# Patient Record
Sex: MALE | Race: WHITE
[De-identification: names, ages, dates, MRNs, and addresses within clinical notes are randomized per-mention and may not be internally consistent; named-entity substitution may affect disease eponyms.]

---

## 2018-09-20 ENCOUNTER — HOSPITAL ENCOUNTER (OUTPATIENT)
Dept: HOSPITAL 92 - SDC | Age: 73
Discharge: HOME | End: 2018-09-20
Attending: OPHTHALMOLOGY
Payer: MEDICARE

## 2018-09-20 DIAGNOSIS — H43.311: Primary | ICD-10-CM

## 2018-09-20 DIAGNOSIS — Z79.899: ICD-10-CM

## 2018-09-20 PROCEDURE — 08T43ZZ RESECTION OF RIGHT VITREOUS, PERCUTANEOUS APPROACH: ICD-10-PCS | Performed by: OPHTHALMOLOGY

## 2018-09-20 PROCEDURE — 08NE3ZZ RELEASE RIGHT RETINA, PERCUTANEOUS APPROACH: ICD-10-PCS | Performed by: OPHTHALMOLOGY

## 2018-09-20 NOTE — OP
DATE OF PROCEDURE:  09/20/2018

 

PREOPERATIVE DIAGNOSIS:  Vitreous opacification.

 

POSTOPERATIVE DIAGNOSIS:  _____ _____ _____ _____ _____ _____ 

 

 

PROCEDURE IN DETAIL:  The patient was prepped and draped in the usual sterile manner for ophthalmic s
urgery on the _____ eye.  Lid speculum was placed in the eye.  The 25-gauge trocars were placed in co
njunctiva and sclera supratemporally, inferotemporally and supranasally.  Infusion line was placed in
ferotemporally.  Light pipe and vitreous cutter inserted into the eye.  Core vitrectomy was performed
.  Vitreous membranes were removed with vitreous cutter.  Indirect ophthalmoscopy was used to examine
 the retina at 360 degrees.  Prophylactic laser was placed behind the sclerotomy site supratemporally
 and inferotemporally.  Trocars were removed.  _____.  Retrobulbar Kenalog and subconjunctival Ancef 
was placed.  The _____ eye was patched and shielded.  The patient was taken to the postoperative chika
very unit in good condition having suffered no immediate perioperative complications.

 

DISCHARGE INSTRUCTIONS:  The patient instructed to keep patch and shield on, avoid lifting or bending
, and follow up with Dr. Levin.